# Patient Record
Sex: FEMALE | Race: WHITE | NOT HISPANIC OR LATINO | Employment: UNEMPLOYED | URBAN - METROPOLITAN AREA
[De-identification: names, ages, dates, MRNs, and addresses within clinical notes are randomized per-mention and may not be internally consistent; named-entity substitution may affect disease eponyms.]

---

## 2024-09-17 ENCOUNTER — HOSPITAL ENCOUNTER (EMERGENCY)
Facility: HOSPITAL | Age: 24
Discharge: HOME | End: 2024-09-18
Attending: EMERGENCY MEDICINE
Payer: COMMERCIAL

## 2024-09-17 ENCOUNTER — APPOINTMENT (EMERGENCY)
Dept: RADIOLOGY | Facility: HOSPITAL | Age: 24
End: 2024-09-17
Payer: COMMERCIAL

## 2024-09-17 VITALS
RESPIRATION RATE: 18 BRPM | DIASTOLIC BLOOD PRESSURE: 58 MMHG | OXYGEN SATURATION: 100 % | WEIGHT: 230 LBS | TEMPERATURE: 98 F | SYSTOLIC BLOOD PRESSURE: 114 MMHG | HEART RATE: 76 BPM

## 2024-09-17 DIAGNOSIS — S62.609A FINGER FRACTURE: Primary | ICD-10-CM

## 2024-09-17 PROCEDURE — 73140 X-RAY EXAM OF FINGER(S): CPT

## 2024-09-17 PROCEDURE — 99284 EMERGENCY DEPT VISIT MOD MDM: CPT | Performed by: EMERGENCY MEDICINE

## 2024-09-17 PROCEDURE — 99283 EMERGENCY DEPT VISIT LOW MDM: CPT

## 2024-09-17 RX ORDER — IBUPROFEN 400 MG/1
800 TABLET, FILM COATED ORAL ONCE
Status: COMPLETED | OUTPATIENT
Start: 2024-09-17 | End: 2024-09-17

## 2024-09-17 RX ADMIN — IBUPROFEN 800 MG: 400 TABLET, FILM COATED ORAL at 22:55

## 2024-09-18 NOTE — ED NOTES
[facility] was faxed the patient d/c documentation per their request. [facility]ple stated they did and that transportation for her would be arranged.     Lizz Alanis RN  09/17/24 0153

## 2024-09-18 NOTE — ED PROVIDER NOTES
1. Finger fracture      ED Disposition       ED Disposition   Discharge    Condition   Stable    Date/Time   Tue Sep 17, 2024 11:25 PM    Comment   Mell Ortiz discharge to home/self care.                   Assessment & Plan       Medical Decision Making  No dislocation.  No neurovascular injury.  No open fracture.  There is a closed fracture to the base of the middle phalanx of the third finger on the volar surface.  Finger was splinted by ED staff under my supervision.  Static aluminum finger splint.  Appropriate for discharge and outpatient management.    Amount and/or Complexity of Data Reviewed  Radiology: ordered and independent interpretation performed. Decision-making details documented in ED Course.    Risk  Prescription drug management.  Decision regarding hospitalization.                         Medications   ibuprofen (MOTRIN) tablet 800 mg (800 mg Oral Given 9/17/24 2255)       History of Present Illness       Patient is a 24-year-old female.  She was playing volleyball.  Shortly prior to arrival.  She injured her left fourth digit.  Complaining of pain and swelling to the PIP joint.  No associated motor or sensory complaints.  No lacerations.  Patient is right-handed.              Review of Systems   Skin:  Negative for wound.   Neurological:  Negative for weakness and numbness.           Objective     ED Triage Vitals   Temperature Pulse Blood Pressure Respirations SpO2 Patient Position - Orthostatic VS   09/17/24 2218 09/17/24 2218 09/17/24 2218 09/17/24 2218 09/17/24 2218 --   98 °F (36.7 °C) 76 114/58 18 100 %       Temp Source Heart Rate Source BP Location FiO2 (%) Pain Score    09/17/24 2218 -- -- -- 09/17/24 2255    Oral    8        Physical Exam  Vitals reviewed.   Constitutional:       General: She is not in acute distress.  HENT:      Head: Normocephalic and atraumatic.      Nose: Nose normal.      Mouth/Throat:      Mouth: Mucous membranes are moist.   Eyes:      General:         Right  eye: No discharge.         Left eye: No discharge.      Conjunctiva/sclera: Conjunctivae normal.   Pulmonary:      Effort: Pulmonary effort is normal. No respiratory distress.   Musculoskeletal:         General: Swelling, tenderness and signs of injury present. No deformity. Normal range of motion.      Cervical back: Normal range of motion and neck supple.      Comments: Tenderness and swelling to the left fourth PIP joint.  Neurovascular exam is normal.   Skin:     General: Skin is warm and dry.      Findings: No rash.   Neurological:      General: No focal deficit present.      Mental Status: She is alert and oriented to person, place, and time.   Psychiatric:         Mood and Affect: Mood normal.         Behavior: Behavior normal.         Labs Reviewed - No data to display  XR finger fourth digit-ring LEFT   ED Interpretation by Remy Le MD (09/17 1320)   Small fracture volar surface base of the middle phalanx left fourth finger.  No dislocation.          Procedures       Remy Le MD  09/17/24 1869

## 2024-09-18 NOTE — ED NOTES
Splint applied, patient provided with ice. Patient requested xray cd which was requested. Patient to return to Rehab.     Lizz Alanis RN  09/17/24 [id number]